# Patient Record
Sex: MALE | Race: WHITE
[De-identification: names, ages, dates, MRNs, and addresses within clinical notes are randomized per-mention and may not be internally consistent; named-entity substitution may affect disease eponyms.]

---

## 2021-04-13 ENCOUNTER — HOSPITAL ENCOUNTER (OUTPATIENT)
Dept: HOSPITAL 75 - RT | Age: 31
End: 2021-04-13
Attending: NURSE PRACTITIONER
Payer: MEDICAID

## 2021-04-13 DIAGNOSIS — R06.02: Primary | ICD-10-CM

## 2021-04-13 DIAGNOSIS — R05: ICD-10-CM

## 2021-04-13 DIAGNOSIS — Z86.16: ICD-10-CM

## 2021-04-13 DIAGNOSIS — Z87.09: ICD-10-CM

## 2021-04-13 PROCEDURE — 94060 EVALUATION OF WHEEZING: CPT

## 2021-04-13 PROCEDURE — 94729 DIFFUSING CAPACITY: CPT

## 2021-04-13 PROCEDURE — 94726 PLETHYSMOGRAPHY LUNG VOLUMES: CPT

## 2021-11-07 ENCOUNTER — HOSPITAL ENCOUNTER (EMERGENCY)
Dept: HOSPITAL 75 - ER | Age: 31
Discharge: LEFT BEFORE DISCHARGE | End: 2021-11-07
Payer: MEDICAID

## 2021-11-07 VITALS — HEIGHT: 67.99 IN | WEIGHT: 253.53 LBS | BODY MASS INDEX: 38.42 KG/M2

## 2021-11-07 VITALS — SYSTOLIC BLOOD PRESSURE: 183 MMHG | DIASTOLIC BLOOD PRESSURE: 129 MMHG

## 2021-11-07 DIAGNOSIS — F31.9: ICD-10-CM

## 2021-11-07 DIAGNOSIS — F41.9: ICD-10-CM

## 2021-11-07 DIAGNOSIS — Z79.899: ICD-10-CM

## 2021-11-07 DIAGNOSIS — R05.9: Primary | ICD-10-CM

## 2021-11-07 DIAGNOSIS — I10: ICD-10-CM

## 2021-11-07 DIAGNOSIS — R07.89: ICD-10-CM

## 2021-11-07 LAB
BASOPHILS # BLD AUTO: 0.1 10^3/UL (ref 0–0.1)
BASOPHILS NFR BLD AUTO: 1 % (ref 0–10)
EOSINOPHIL # BLD AUTO: 0.4 10^3/UL (ref 0–0.3)
EOSINOPHIL NFR BLD AUTO: 3 % (ref 0–10)
HCT VFR BLD CALC: 50 % (ref 40–54)
HGB BLD-MCNC: 17.1 G/DL (ref 13.3–17.7)
LYMPHOCYTES # BLD AUTO: 3.8 10^3/UL (ref 1–4)
LYMPHOCYTES NFR BLD AUTO: 29 % (ref 12–44)
MANUAL DIFFERENTIAL PERFORMED BLD QL: NO
MCH RBC QN AUTO: 30 PG (ref 25–34)
MCHC RBC AUTO-ENTMCNC: 35 G/DL (ref 32–36)
MCV RBC AUTO: 88 FL (ref 80–99)
MONOCYTES # BLD AUTO: 1.1 10^3/UL (ref 0–1)
MONOCYTES NFR BLD AUTO: 8 % (ref 0–12)
NEUTROPHILS # BLD AUTO: 7.6 10^3/UL (ref 1.8–7.8)
NEUTROPHILS NFR BLD AUTO: 59 % (ref 42–75)
PLATELET # BLD: 500 10^3/UL (ref 130–400)
PMV BLD AUTO: 8.8 FL (ref 9–12.2)
WBC # BLD AUTO: 13 10^3/UL (ref 4.3–11)

## 2021-11-07 PROCEDURE — 36415 COLL VENOUS BLD VENIPUNCTURE: CPT

## 2021-11-07 PROCEDURE — 85025 COMPLETE CBC W/AUTO DIFF WBC: CPT

## 2021-11-07 PROCEDURE — 93005 ELECTROCARDIOGRAM TRACING: CPT

## 2021-11-07 PROCEDURE — 99281 EMR DPT VST MAYX REQ PHY/QHP: CPT

## 2021-11-07 NOTE — ED COUGH/URI
General


Chief Complaint:  COVID19 Suspect/Confirmed


Stated Complaint:  CP/OFF OF MEDS


Nursing Triage Note:  


Pt arrival to ER with complaint of SOA, Hurts to Breathe, Cough x4 days. Pt 


states that he hasn't been taking his medications for HTN. Pt denies covid 


vaccination. Pt rates pain at a 1-5 on scale. Pt is unsure of number to rate his




pain. Pt states that his chest hurts when he breathes. 


 (MARIE PICHARDO)





History of Present Illness


Date Seen by Provider:  Nov 7, 2021


Time Seen by Provider:  20:15


Initial Comments


30-year-old  male presents for shortness of breath and pleurisy related

chest pain, present for 4 days. Denies cardiac history, has not received COVID 

vaccine. Denies N/V/D. History of anxiety, ADD, HTN and has not been taking 

medications routinely.


Timing/Duration:  intermittent (4 days)


Severity/Quality:  dry cough


Prior Episodes/Possible Cause:  no prior episodes


Associated Symptoms:  chest pain/soreness, cough


 (MARIE PICHARDO)





Allergies and Home Medications


Allergies


Coded Allergies:  


     Penicillins (Unverified  Allergy, Unknown, 5/3/15)





Patient Home Medication List


Home Medication List Reviewed:  Yes


 (MARIE PICHARDO)


Aripiprazole (Abilify) 10 Mg Tablet, 10 MG PO DAILY, (Reported)


   Entered as Reported by: MIRIAN SALGADO on 5/3/15 1851


Budesonide/Formoterol Fumarate (Symbicort 160/4.5 Mcg (Non-Formulary)) 1 Inhaler

Aero, 1 PUFF IH BID, (Reported)


   Entered as Reported by: MIRIAN SALGADO on 5/3/15 1851


Enalapril Maleate (Enalapril Maleate) 5 Mg Tablet, 5 MG PO DAILY, (Reported)


   Entered as Reported by: MIRIAN SALGADO on 5/3/15 1851


Paroxetine HCl (Paxil Cr) 25 Mg Tab.er.24h, 25 MG PO DAILY, (Reported)


   Entered as Reported by: MIRIAN SALGADO on 5/3/15 1851


Prednisone (Prednisone) 20 Mg Tablet, 20 MG PO DAILY


   Prescribed by: WALTER DE LEON on 5/3/15 2019





Review of Systems


Review of Systems


Constitutional:  no symptoms reported, see HPI


EENTM:  see HPI, no symptoms reported


Respiratory:  see HPI, cough; No dyspnea on exertion, No short of breath


Cardiovascular:  see HPI, chest pain (to palpation)


Gastrointestinal:  no symptoms reported, see HPI


Genitourinary:  no symptoms reported, see HPI


Skin:  no symptoms reported, see HPI (MARIE PICHARDO)





All Other Systems Reviewed


Negative Unless Noted:  Yes


 (MARIE PICHARDO)





Past Medical-Social-Family Hx


Patient Social History


Tobacco Use?:  No


Use of E-Cig and/or Vaping dev:  No


Substance use?:  No


Alcohol Use?:  No


Pt feels they are or have been:  No


 (MARIE PICHARDO)





Immunizations Up To Date


Influenza Vaccine Up-to-Date:  No; Not Current


 (MARIE PICHARDO)





Seasonal Allergies


Seasonal Allergies:  No


 (MARIE PICHARDO)





Past Medical History


Hypertension


ADD/ADHD, PTSD, Bipolar


 (MARIE PICHARDO)





Family Medical History


Reviewed Nursing Family Hx


 (MARIE PICHARDO)


Stroke


 (MARIE PICHARDO)





Physical Exam





Vital Signs - First Documented








 11/7/21





 20:15


 


Temp 37.2


 


Pulse 118


 


Resp 20


 


B/P (MAP) 183/129 (147)


 


Pulse Ox 98


 


O2 Delivery Room Air








 (EDD,MESHA K DO)


Capillary Refill : Less Than 3 Seconds 


 (MARIE PICHARDO)


Height: 5'2"


Weight: 240lbs. oz. 108.654622zl; 38.00 BMI


Method:Stated


General Appearance:  WD/WN, no apparent distress


HEENT:  PERRL/EOMI, normal ENT inspection, TMs normal, pharynx normal


Neck:  non-tender, full range of motion, supple, normal inspection


Respiratory:  lungs clear, normal breath sounds


Cardiovascular:  normal peripheral pulses, no edema, no murmur, tachycardia


Gastrointestinal:  normal bowel sounds, non tender, soft


Neurologic/Psychiatric:  no motor/sensory deficits, alert, normal mood/affect, 

oriented x 3


Skin:  normal color, warm/dry (MARIE PICHARDO)





Progress/Results/Core Measures


Suspected Sepsis


SIRS


Temperature: 


Pulse: 118 


Respiratory Rate: 20


 


Laboratory Tests


11/7/21 20:34: White Blood Count 13.0H


Blood Pressure 183 /129 


Mean: 147


 


Laboratory Tests


11/7/21 20:34: Platelet Count 500H


 (MARIE PICHARDO)





Results/Orders


Lab Results





Laboratory Tests








Test


 11/7/21


20:34 Range/Units


 


 


White Blood Count


 13.0 H


 4.3-11.0


10^3/uL


 


Red Blood Count


 5.67 H


 4.30-5.52


10^6/uL


 


Hemoglobin 17.1  13.3-17.7  g/dL


 


Hematocrit 50  40-54  %


 


Mean Corpuscular Volume 88  80-99  fL


 


Mean Corpuscular Hemoglobin 30  25-34  pg


 


Mean Corpuscular Hemoglobin


Concent 35 


 32-36  g/dL





 


Red Cell Distribution Width 12.8  10.0-14.5  %


 


Platelet Count


 500 H


 130-400


10^3/uL


 


Mean Platelet Volume 8.8 L 9.0-12.2  fL


 


Immature Granulocyte % (Auto) 0   %


 


Neutrophils (%) (Auto) 59  42-75  %


 


Lymphocytes (%) (Auto) 29  12-44  %


 


Monocytes (%) (Auto) 8  0-12  %


 


Eosinophils (%) (Auto) 3  0-10  %


 


Basophils (%) (Auto) 1  0-10  %


 


Neutrophils # (Auto)


 7.6 


 1.8-7.8


10^3/uL


 


Lymphocytes # (Auto)


 3.8 


 1.0-4.0


10^3/uL


 


Monocytes # (Auto)


 1.1 H


 0.0-1.0


10^3/uL


 


Eosinophils # (Auto)


 0.4 H


 0.0-0.3


10^3/uL


 


Basophils # (Auto)


 0.1 


 0.0-0.1


10^3/uL


 


Immature Granulocyte # (Auto)


 0.0 


 0.0-0.1


10^3/uL





 (EDD,MESHA K DO)


Vital Signs/I&O











 11/7/21





 20:15


 


Temp 37.2


 


Pulse 118


 


Resp 20


 


B/P (MAP) 183/129 (147)


 


Pulse Ox 98


 


O2 Delivery Room Air








 (EDD,MESHA K DO)


Vital Signs/I&O


Capillary Refill : Less Than 3 Seconds 


 (MARIE PICHARDO)








Blood Pressure Mean:                    147








Progress Note :  


   Time:  20:15


Progress Note


Patient seen and evaluated, will do cardiac work-up and test for Covid as well 

as influenza.  Patient will be given aspirin 324 mg orally.  Discussed with 

patient that this is probably pleurisy and not cardiac in nature but we will not

know until he has a full work-up.


2035 patient requesting to leave, explained that we have not completed a full 

work-up and we will not run any further test if he is leaving [place].  Discussed a 

full work up is recommended but if he wishes to leave that he may, but I can not

say that he has nothing serious or something he could die from. Discharge 

instructions and return precautions reviewed with him. Lab notified to NOT run 

any tests, as the patient is leaving [place] and he will not be here for the 

results. 


 (MAREI PICHARDO)





ECG


Initial ECG Impression Date:  Nov 7, 2021


Initial ECG Impression Time:  20:25


Initial ECG Rate:  107


Initial ECG Rhythm:  S.Tach


Initial ECG Intervals:  Normal


Initial ECG Intervals


, QRSD 97, , QTc 461.  Axis P 52, QRS 16, T 35.


Initial ECG Impression:  Normal


Initial ECG Comparisson:  No Previous ECG Available


 (MARIE PICHARDO)





Departure


Impression





   Primary Impression:  


   Cough


   Additional Impression:  


   Chest wall pain


Disposition:  07 AGAINST MEDICAL ADVICE


Condition:  Stable





Departure-Patient Inst.


Decision time for Depature:  20:42


 (MARIE PICHARDO)


Referrals:  


[facility] (PCP)


Primary Care Physician








TRU GARCES (Family)


Primary Care Physician


Patient Instructions:  Pleuritic Chest Pain (DC)





Add. Discharge Instructions:  


You are leaving AGAINST MEDICAL ADVICE because a full assessment and work-up 

have not been completed.


No tests are being completed.


Follow-up with your primary care provider if symptoms are not improving or 

worsen.


Return to the emergency department for new, urgent healthcare needs.





All discharge instructions reviewed with patient and/or family. Voiced 

understanding.








ATTENDING PHYSICIAN NOTE:


I WAS PHYSICALLY PRESENT AS ER PHYSICIAN WHEN THIS PATIENT WAS IN ER, BUT I WAS 

NOT INVOLVED IN ANY DECISION MAKING OR ANY CARE OF THIS PATIENT. 


 (MESHA PUGA DO)





Copy


Copies To 1:   BALDEMAR CASTRO AMY ARNP                   Nov 7, 2021 20:44


MESHA PUGA DO                 Nov 12, 2021 05:29

## 2023-07-12 ENCOUNTER — HOSPITAL ENCOUNTER (OUTPATIENT)
Dept: HOSPITAL 75 - PREOP | Age: 33
LOS: 2 days | End: 2023-07-14
Attending: SURGERY
Payer: MEDICAID

## 2023-07-12 VITALS — BODY MASS INDEX: 42.3 KG/M2 | HEIGHT: 67.99 IN | WEIGHT: 279.11 LBS

## 2023-07-12 DIAGNOSIS — Z01.818: Primary | ICD-10-CM
